# Patient Record
Sex: FEMALE | Race: WHITE | NOT HISPANIC OR LATINO | Employment: FULL TIME | URBAN - METROPOLITAN AREA
[De-identification: names, ages, dates, MRNs, and addresses within clinical notes are randomized per-mention and may not be internally consistent; named-entity substitution may affect disease eponyms.]

---
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
## 2020-07-22 PROBLEM — K59.9 FUNCTIONAL BOWEL DISORDER: Status: ACTIVE | Noted: 2020-07-22

## 2020-07-22 PROBLEM — K62.5 BRIGHT RED RECTAL BLEEDING: Status: ACTIVE | Noted: 2020-07-22

## 2021-01-07 DIAGNOSIS — R05.9 COUGH: ICD-10-CM

## 2021-01-07 PROCEDURE — U0005 INFEC AGEN DETEC AMPLI PROBE: HCPCS | Performed by: INTERNAL MEDICINE

## 2021-01-07 PROCEDURE — U0003 INFECTIOUS AGENT DETECTION BY NUCLEIC ACID (DNA OR RNA); SEVERE ACUTE RESPIRATORY SYNDROME CORONAVIRUS 2 (SARS-COV-2) (CORONAVIRUS DISEASE [COVID-19]), AMPLIFIED PROBE TECHNIQUE, MAKING USE OF HIGH THROUGHPUT TECHNOLOGIES AS DESCRIBED BY CMS-2020-01-R: HCPCS | Performed by: INTERNAL MEDICINE

## 2021-01-08 LAB — SARS-COV-2 RNA SPEC QL NAA+PROBE: NOT DETECTED

## 2021-04-13 DIAGNOSIS — Z23 ENCOUNTER FOR IMMUNIZATION: ICD-10-CM

## 2021-05-12 PROBLEM — R10.13 EPIGASTRIC PAIN: Status: ACTIVE | Noted: 2021-05-12

## 2021-08-19 ENCOUNTER — HOSPITAL ENCOUNTER (OUTPATIENT)
Dept: ULTRASOUND IMAGING | Facility: HOSPITAL | Age: 21
Discharge: HOME | End: 2021-08-19
Payer: COMMERCIAL

## 2021-08-19 DIAGNOSIS — R10.13 ABDOMINAL PAIN, EPIGASTRIC: ICD-10-CM

## 2021-08-19 PROCEDURE — 76705 ECHO EXAM OF ABDOMEN: CPT

## 2021-11-22 ENCOUNTER — CONSULT (OUTPATIENT)
Dept: GASTROENTEROLOGY | Facility: CLINIC | Age: 21
End: 2021-11-22
Payer: COMMERCIAL

## 2021-11-22 VITALS
WEIGHT: 205 LBS | HEART RATE: 88 BPM | BODY MASS INDEX: 34.16 KG/M2 | TEMPERATURE: 98.4 F | DIASTOLIC BLOOD PRESSURE: 75 MMHG | HEIGHT: 65 IN | SYSTOLIC BLOOD PRESSURE: 107 MMHG

## 2021-11-22 DIAGNOSIS — R10.13 EPIGASTRIC PAIN: Primary | ICD-10-CM

## 2021-11-22 DIAGNOSIS — K59.00 CONSTIPATION, UNSPECIFIED CONSTIPATION TYPE: ICD-10-CM

## 2021-11-22 PROCEDURE — 99243 OFF/OP CNSLTJ NEW/EST LOW 30: CPT | Performed by: PHYSICIAN ASSISTANT

## 2021-11-22 RX ORDER — CETIRIZINE HYDROCHLORIDE 10 MG/1
TABLET ORAL DAILY
COMMUNITY

## 2021-11-22 RX ORDER — FLUTICASONE PROPIONATE 50 MCG
SPRAY, SUSPENSION (ML) NASAL DAILY
COMMUNITY
End: 2022-01-18

## 2021-11-22 RX ORDER — MECLIZINE HYDROCHLORIDE 25 MG/1
TABLET ORAL
COMMUNITY

## 2021-11-22 RX ORDER — ONDANSETRON 4 MG/1
TABLET, ORALLY DISINTEGRATING ORAL
COMMUNITY
Start: 2021-08-18

## 2021-11-29 DIAGNOSIS — K59.00 CONSTIPATION, UNSPECIFIED CONSTIPATION TYPE: Primary | ICD-10-CM

## 2022-01-17 ENCOUNTER — TELEPHONE (OUTPATIENT)
Dept: GASTROENTEROLOGY | Facility: AMBULARY SURGERY CENTER | Age: 22
End: 2022-01-17

## 2022-01-18 ENCOUNTER — ANESTHESIA EVENT (OUTPATIENT)
Dept: GASTROENTEROLOGY | Facility: AMBULARY SURGERY CENTER | Age: 22
End: 2022-01-18

## 2022-01-18 ENCOUNTER — HOSPITAL ENCOUNTER (OUTPATIENT)
Dept: GASTROENTEROLOGY | Facility: AMBULARY SURGERY CENTER | Age: 22
Setting detail: OUTPATIENT SURGERY
Discharge: HOME | End: 2022-01-18
Admitting: PHYSICIAN ASSISTANT
Payer: COMMERCIAL

## 2022-01-18 ENCOUNTER — ANESTHESIA (OUTPATIENT)
Dept: GASTROENTEROLOGY | Facility: AMBULARY SURGERY CENTER | Age: 22
End: 2022-01-18

## 2022-01-18 VITALS
RESPIRATION RATE: 18 BRPM | WEIGHT: 216 LBS | DIASTOLIC BLOOD PRESSURE: 63 MMHG | HEART RATE: 85 BPM | BODY MASS INDEX: 35.99 KG/M2 | SYSTOLIC BLOOD PRESSURE: 113 MMHG | TEMPERATURE: 98.8 F | HEIGHT: 65 IN | OXYGEN SATURATION: 98 %

## 2022-01-18 DIAGNOSIS — R10.13 EPIGASTRIC PAIN: ICD-10-CM

## 2022-01-18 LAB
EXT PREGNANCY TEST URINE: NEGATIVE
EXT. CONTROL: NORMAL

## 2022-01-18 PROCEDURE — 88305 TISSUE EXAM BY PATHOLOGIST: CPT | Performed by: PATHOLOGY

## 2022-01-18 PROCEDURE — 43239 EGD BIOPSY SINGLE/MULTIPLE: CPT | Performed by: INTERNAL MEDICINE

## 2022-01-18 PROCEDURE — 81025 URINE PREGNANCY TEST: CPT | Performed by: INTERNAL MEDICINE

## 2022-01-18 RX ORDER — SODIUM CHLORIDE, SODIUM LACTATE, POTASSIUM CHLORIDE, CALCIUM CHLORIDE 600; 310; 30; 20 MG/100ML; MG/100ML; MG/100ML; MG/100ML
INJECTION, SOLUTION INTRAVENOUS CONTINUOUS PRN
Status: DISCONTINUED | OUTPATIENT
Start: 2022-01-18 | End: 2022-01-18

## 2022-01-18 RX ORDER — ALBUTEROL SULFATE 2.5 MG/3ML
2.5 SOLUTION RESPIRATORY (INHALATION) ONCE AS NEEDED
Status: DISCONTINUED | OUTPATIENT
Start: 2022-01-18 | End: 2022-01-22 | Stop reason: HOSPADM

## 2022-01-18 RX ORDER — MEPERIDINE HYDROCHLORIDE 25 MG/ML
12.5 INJECTION INTRAMUSCULAR; INTRAVENOUS; SUBCUTANEOUS
Status: DISCONTINUED | OUTPATIENT
Start: 2022-01-18 | End: 2022-01-22 | Stop reason: HOSPADM

## 2022-01-18 RX ORDER — HYDROMORPHONE HCL 110MG/55ML
0.5 PATIENT CONTROLLED ANALGESIA SYRINGE INTRAVENOUS
Status: DISCONTINUED | OUTPATIENT
Start: 2022-01-18 | End: 2022-01-22 | Stop reason: HOSPADM

## 2022-01-18 RX ORDER — FENTANYL CITRATE/PF 50 MCG/ML
25 SYRINGE (ML) INJECTION
Status: DISCONTINUED | OUTPATIENT
Start: 2022-01-18 | End: 2022-01-22 | Stop reason: HOSPADM

## 2022-01-18 RX ORDER — PROMETHAZINE HYDROCHLORIDE 25 MG/ML
12.5 INJECTION, SOLUTION INTRAMUSCULAR; INTRAVENOUS ONCE AS NEEDED
Status: DISCONTINUED | OUTPATIENT
Start: 2022-01-18 | End: 2022-01-22 | Stop reason: HOSPADM

## 2022-01-18 RX ORDER — ONDANSETRON 2 MG/ML
4 INJECTION INTRAMUSCULAR; INTRAVENOUS ONCE AS NEEDED
Status: DISCONTINUED | OUTPATIENT
Start: 2022-01-18 | End: 2022-01-22 | Stop reason: HOSPADM

## 2022-01-18 RX ADMIN — SODIUM CHLORIDE, SODIUM LACTATE, POTASSIUM CHLORIDE, AND CALCIUM CHLORIDE: .6; .31; .03; .02 INJECTION, SOLUTION INTRAVENOUS at 09:10

## 2022-01-18 NOTE — H&P
History and Physical -  [facility]  Blanche Ríos 24 y o  female MRN: [id number]      HPI: Blanche Ríos is a 24y o  year old female who presents for epigastric pain for EGD  REVIEW OF SYSTEMS: Per the HPI, and otherwise unremarkable  Historical Information   No past medical history on file  No past surgical history on file  Social History   Social History     Substance and Sexual Activity   Alcohol Use No     Social History     Substance and Sexual Activity   Drug Use No     Social History     Tobacco Use   Smoking Status Never Smoker   Smokeless Tobacco Never Used     Family History   Problem Relation Age of Onset    Colon cancer Neg Hx        Meds/Allergies       Current Outpatient Medications:     cetirizine (ZyrTEC Allergy) 10 mg tablet    fluticasone (Flonase) 50 mcg/act nasal spray    levonorgestrel-ethinyl estradiol (NORDETTE) 0 15-30 MG-MCG per tablet    linaCLOtide 290 MCG CAPS    meclizine (ANTIVERT) 25 mg tablet    ondansetron (ZOFRAN-ODT) 4 mg disintegrating tablet    predniSONE 20 mg tablet    No Known Allergies    Objective     There were no vitals taken for this visit  PHYSICAL EXAM    Gen: NAD  Head: NCAT  CV: RRR  CHEST: Clear  ABD: soft, NT/ND  EXT: no edema      ASSESSMENT/PLAN:  This is a 24y o  year old female here for EGD, and she is stable and optimized for her procedure

## 2022-01-18 NOTE — ANESTHESIA PREPROCEDURE EVALUATION
Procedure:  EGD    Relevant Problems   GI/HEPATIC   (+) Bright red rectal bleeding      Other   (+) Epigastric pain   (+) Functional bowel disorder        Physical Exam    Airway    Mallampati score: I  TM Distance: >3 FB  Neck ROM: full     Dental       Cardiovascular  Cardiovascular exam normal    Pulmonary  Pulmonary exam normal     Other Findings        Anesthesia Plan  ASA Score- 2     Anesthesia Type- IV sedation with anesthesia with ASA Monitors  Additional Monitors:   Airway Plan:           Plan Factors-Exercise tolerance (METS): >4 METS  Chart reviewed  EKG reviewed  Imaging results reviewed  Existing labs reviewed  Patient summary reviewed  Induction- intravenous  Postoperative Plan- Plan for postoperative opioid use  Planned trial extubation    Informed Consent- Anesthetic plan and risks discussed with patient  I personally reviewed this patient with the CRNA  Discussed and agreed on the Anesthesia Plan with the CRNA  Donavon Gonzalez

## 2022-01-18 NOTE — ANESTHESIA POSTPROCEDURE EVALUATION
Post-Op Assessment Note    CV Status:  Stable    Pain management: adequate  Multimodal analgesia used between 6 hours prior to anesthesia start to PACU discharge    Mental Status:  Sleepy   PONV Controlled:  Controlled   Airway Patency:  Patent   Two or more mitigation strategies used for obstructive sleep apnea   Post Op Vitals Reviewed: Yes      Staff: Anesthesiologist         No complications documented      /59 (01/18/22 0951)    Temp 98 8 °F (37 1 °C) (01/18/22 0951)    Pulse 93 (01/18/22 0951)   Resp 15 (01/18/22 0951)    SpO2 94 % (01/18/22 0951)

## 2022-02-08 ENCOUNTER — TELEPHONE (OUTPATIENT)
Dept: GASTROENTEROLOGY | Facility: CLINIC | Age: 22
End: 2022-02-08

## 2022-02-10 ENCOUNTER — OFFICE VISIT (OUTPATIENT)
Dept: BARIATRICS | Facility: CLINIC | Age: 22
End: 2022-02-10
Payer: COMMERCIAL

## 2022-02-10 VITALS
TEMPERATURE: 98.9 F | HEIGHT: 65 IN | WEIGHT: 213.5 LBS | HEART RATE: 71 BPM | SYSTOLIC BLOOD PRESSURE: 112 MMHG | BODY MASS INDEX: 35.57 KG/M2 | RESPIRATION RATE: 14 BRPM | DIASTOLIC BLOOD PRESSURE: 76 MMHG

## 2022-02-10 DIAGNOSIS — E66.9 OBESITY, CLASS II, BMI 35-39.9: Primary | ICD-10-CM

## 2022-02-10 PROCEDURE — 99203 OFFICE O/P NEW LOW 30 MIN: CPT | Performed by: PHYSICIAN ASSISTANT

## 2022-02-10 NOTE — PROGRESS NOTES
Assessment/Plan:    Obesity, Class II, BMI 35-39 9  -Discussed options of Healthy[facility]-Intensive Lifestyle Intervention Program, Very Low Calorie Diet-VLCD and Conservative Program and the role of weight loss medications   -Initial weight loss goal of 5-10% weight loss for improved health  -Screening labs  Recommend checking lab coverage before having labs drawn  -NEEDS Lipids, tsh, a1c, cmp, fasting insulin   - STOP BANG-1/8  -patient just bought another keto plan  Wants to try that  If not successful will call back for vlcd           Diagnoses and all orders for this visit:    Obesity, Class II, BMI 35-39 9  -     TSH, 3rd generation with Free T4 reflex; Future  -     Comprehensive metabolic panel; Future  -     HEMOGLOBIN A1C W/ EAG ESTIMATION; Future  -     Insulin, fasting; Future  -     Lipid panel; Future          Subjective:   Chief Complaint   Patient presents with    Consult     MWM Consult       Patient ID: Allie Mike  is a 24 y o  female with excess weight/obesity here to pursue weight management  History reviewed  No pertinent past medical history  HPI: pwc consultant   Obesity/Excess Weight:  Severity: Severe  Onset:  Since august 2021   Modifiers: Diet and Exercise  Contributing factors: portion size   Associated symptoms: decreased self esteem     Going to  2-3 times per week     Goals: 180 lbs   Hydration: 64 oz of water, 1 cup of coffee with SF creamer   Alcohol: 4-8 drinks per week        The following portions of the patient's history were reviewed and updated as appropriate: allergies, current medications, past family history, past medical history, past social history, past surgical history and problem list     Review of Systems   HENT: Negative for sore throat  Respiratory: Negative for cough and shortness of breath  Cardiovascular: Negative for chest pain and palpitations  Gastrointestinal: Positive for abdominal pain (sees GI ), constipation and diarrhea   Negative for nausea and vomiting  Denies GERD   Endocrine: Negative for cold intolerance and heat intolerance  Genitourinary: Negative for dysuria  Musculoskeletal: Negative for arthralgias and back pain  Skin: Negative for rash  Neurological: Negative for headaches  Psychiatric/Behavioral: Negative for suicidal ideas (denies HI)  Denies Depression and anxiety       Objective:    /76   Pulse 71   Temp 98 9 °F (37 2 °C) (Tympanic)   Resp 14   Ht 5' 5" (1 651 m)   Wt 96 8 kg (213 lb 8 oz)   BMI 35 53 kg/m²     Physical Exam  Vitals and nursing note reviewed  Constitutional   General appearance: Abnormal   well developed and morbidly obese  Eyes No conjunctival injection   Pulmonary   Respiratory effort: No increased work of breathing or signs of respiratory distress  Abdomen   Abdomen: Abnormal   The abdomen was obese  Musculoskeletal   Gait and station: Normal     Skin  Dry   No visible rashes   Psychiatric   Orientation to person, place and time: Normal     Affect: appropriate  Neurological   Normal gait

## 2022-02-10 NOTE — ASSESSMENT & PLAN NOTE
-Discussed options of Healthy[facility]-Intensive Lifestyle Intervention Program, Very Low Calorie Diet-VLCD and Conservative Program and the role of weight loss medications   -Initial weight loss goal of 5-10% weight loss for improved health  -Screening labs  Recommend checking lab coverage before having labs drawn  -NEEDS Lipids, tsh, a1c, cmp, fasting insulin   - STOP BANG-1/8  -patient just bought another keto plan  Wants to try that   If not successful will call back for vlcd

## 2022-03-14 ENCOUNTER — OFFICE VISIT (OUTPATIENT)
Dept: GASTROENTEROLOGY | Facility: CLINIC | Age: 22
End: 2022-03-14
Payer: COMMERCIAL

## 2022-03-14 VITALS
SYSTOLIC BLOOD PRESSURE: 106 MMHG | HEART RATE: 81 BPM | BODY MASS INDEX: 35.59 KG/M2 | WEIGHT: 213.6 LBS | DIASTOLIC BLOOD PRESSURE: 76 MMHG | TEMPERATURE: 99.1 F | HEIGHT: 65 IN | OXYGEN SATURATION: 96 %

## 2022-03-14 DIAGNOSIS — K59.00 CONSTIPATION, UNSPECIFIED CONSTIPATION TYPE: Primary | ICD-10-CM

## 2022-03-14 PROCEDURE — 99213 OFFICE O/P EST LOW 20 MIN: CPT | Performed by: PHYSICIAN ASSISTANT

## 2022-03-14 RX ORDER — LUBIPROSTONE 8 UG/1
8 CAPSULE, GELATIN COATED ORAL 2 TIMES DAILY WITH MEALS
Qty: 60 CAPSULE | Refills: 1 | Status: SHIPPED | OUTPATIENT
Start: 2022-03-14 | End: 2022-05-09

## 2022-03-14 NOTE — PROGRESS NOTES
[facility] Outpatient Follow-up [facility]  Ray Diza 24 y o  female MRN: [id number]  Encounter: [id number]          ASSESSMENT AND PLAN:      1  Constipation, unspecified constipation type  2  Irritable Bowel Syndrome    - lubiprostone (AMITIZA) 8 mcg capsule; Take 1 capsule (8 mcg total) by mouth 2 (two) times a day with meals  Dispense: 60 capsule; Refill: 1      Patient continues to experience abdominal pain/cramping and bowel movements that are predominantly constipation but will alternate to diarrhea  Colonoscopy normal in 2020 and recent EGD normal with negative biopsies for celiac and H pylori  She has atypical responses to bowel regimen agents - stool softeners cause diarrhea, while Miralax and Linzess did not work to stimulate a bowel movement  Will trial on amitiza 8mcg 1-2x daily  Advised her to keep a journal of food and symptoms to see if we can identify any sensitivities or trigger foods  She is not interested in nutritional counseling for the low-FODMAP diet at this time, since she feels she is eating a clean diet  She will continue to follow with her PCP who completed a workup for her abdominal pain and recent weight gain  If her bowel pattern becomes more diarrhea-predominant, can consider a trial of Xifaxan  Discussed with the patient and her mother and all questions were answered  Both are in agreement with this plan   ______________________________________________________________________    SUBJECTIVE:  Cinda is a 80-year-old female who presents for follow up of abdominal pain and bowel disturbance  She had an EGD on 01/18/2022 which was a normal study, bx negative for celiac and H pylori  She has stopped taking TUMs since this didn't seem effective for her    She continues to experience abdominal pain which can be epigastric or periumbilical   Her bowel movements alternate between days of constipation with a sense of fullness and cramping, and days of loose watery stools  She tried Linzess which caused increased abdominal pain and did not result in a bowel movement  She has tried multiple other agents  She states stool softeners resulted in diarrhea  Miralax 1-2x daily did not result in a bowel movement  She has not had any changes in her menstrual cycle  She has followed with her PCP for weight gain with reportedly normal TSH ([facility] could not download results for my review) and has also seen bariatric surgery for consultation  REVIEW OF SYSTEMS IS OTHERWISE NEGATIVE  Historical Information   History reviewed  No pertinent past medical history  Past Surgical History:   Procedure Laterality Date    COLONOSCOPY      WISDOM TOOTH EXTRACTION       Social History   Social History     Substance and Sexual Activity   Alcohol Use No     Social History     Substance and Sexual Activity   Drug Use No     Social History     Tobacco Use   Smoking Status Never Smoker   Smokeless Tobacco Never Used     Family History   Problem Relation Age of Onset    Cancer Mother         lymphoma     Cancer Father         prostate     Breast cancer Paternal Aunt     Brain cancer Maternal Grandmother     Cancer Paternal Grandfather     Colon cancer Neg Hx     Diabetes Neg Hx     Hypertension Neg Hx     Heart disease Neg Hx     Stroke Neg Hx     Thyroid disease Neg Hx        Meds/Allergies       Current Outpatient Medications:     cetirizine (ZyrTEC Allergy) 10 mg tablet    levonorgestrel-ethinyl estradiol (NORDETTE) 0 15-30 MG-MCG per tablet    meclizine (ANTIVERT) 25 mg tablet    ondansetron (ZOFRAN-ODT) 4 mg disintegrating tablet    lubiprostone (AMITIZA) 8 mcg capsule    No Known Allergies        Objective     Blood pressure 106/76, pulse 81, temperature 99 1 °F (37 3 °C), temperature source Tympanic, height 5' 5" (1 651 m), weight 96 9 kg (213 lb 9 6 oz), SpO2 96 %  Body mass index is 35 54 kg/m²        PHYSICAL EXAM:      General Appearance:   Alert, cooperative, no distress   HEENT:   Normocephalic, atraumatic, anicteric      Neck:  Supple, symmetrical, trachea midline   Lungs:   Clear to auscultation bilaterally; no rales, rhonchi or wheezing; respirations unlabored    Heart[de-identified]   Regular rate and rhythm; no murmur, rub, or gallop     Abdomen:   Soft, mildly-tender diffusely, non-distended; hypoactive bowel sounds; no masses, no organomegaly    Genitalia:   Deferred    Rectal:   Deferred    Extremities:  No cyanosis, clubbing or edema    Pulses:  2+ and symmetric    Skin:  No jaundice, rashes, or lesions        [place] Cassette, PA-C

## 2022-03-24 ENCOUNTER — TELEPHONE (OUTPATIENT)
Dept: GASTROENTEROLOGY | Facility: CLINIC | Age: 22
End: 2022-03-24

## 2022-03-24 NOTE — TELEPHONE ENCOUNTER
----- Message from Laura Encarnacion sent at 3/23/2022  2:30 PM EDT -----  Regarding: Prescription   Hello  This has actually not been resolved  I just got off the phone with [facility] and they said that they sent you a prescription notice  If they do not get an answer from you about some approval then I will not be able to receive the prescription  Can you please help me out with this as [place] been waiting over a week now to receive this prescription and it turns out the issue is on your end  Thanks for your time!     Cinda

## 2022-03-24 NOTE — TELEPHONE ENCOUNTER
I called [facility]  3x holding and no one picks up  I called back 4th time, hit option to leave a message , we need prescription coverage info, also to confirm prior Brinson Bethany is needed since Achilles Shear is now generic , maybe cost is due to pts deductible  Awaiting call back

## 2022-03-25 NOTE — TELEPHONE ENCOUNTER
Submitted auth request through cover my meds  [facility]  Sent to Oscar Kelly  [phone]   Will follow up later today

## 2022-03-25 NOTE — TELEPHONE ENCOUNTER
Called and spoke with pharmacist  [place] and generic need prior auth  Received script info  [id number], PCN-[facility], GRP-RXCAP ID# [id number]   Will start auth now

## 2022-03-28 NOTE — TELEPHONE ENCOUNTER
Not sure if you know if they have tried Lactulose? Possible addend note? Or try another med    Graciella More

## 2022-03-28 NOTE — TELEPHONE ENCOUNTER
Called [employer] at [phone] and spoke with [facility]  She stated they denied the appeal for the Amitiza bc they have no documentation that patient tried and failed Lactulose and no note as to why pt should be on Lubiprostone  Please advise     Thank you

## 2022-03-28 NOTE — TELEPHONE ENCOUNTER
[place] denied as she has not tried & failed lactulose  This was sent to her pharmacy  If no improvement then can likely get jaylin Ellington

## 2022-03-29 ENCOUNTER — TELEPHONE (OUTPATIENT)
Dept: GASTROENTEROLOGY | Facility: AMBULARY SURGERY CENTER | Age: 22
End: 2022-03-29

## 2022-03-29 NOTE — TELEPHONE ENCOUNTER
Pt returned my call  I went over why insurance will not cover the [address]  Pt verbalized understanding  I advised if Lactulose is not helping to call the office asap and we can fight for the Amitiza again  Pt understood

## 2022-03-29 NOTE — TELEPHONE ENCOUNTER
Called pt and left detailed message explaining per her insurance they want her to try Lactulose first and that it was already called into the pharmacy  I advised pt to please return my call to verify she did receive my message and to verify she had no further questions

## 2022-03-29 NOTE — TELEPHONE ENCOUNTER
Pt was sent lactulose a few days ago b/c insurance would not cover Amitiza until this was tried & failed  We would need documentation that she tried & failed lactulose    Juanita Min

## 2022-03-29 NOTE — TELEPHONE ENCOUNTER
PT called and stated the liquid Trulance is very inconvenient , she travels every two weeks and it is difficult to take on a plane  She would like the pills called in instead  Please call pt when pills sent in  Thank you!

## 2022-03-30 NOTE — TELEPHONE ENCOUNTER
Spoke with patient who states that lactulose 30 mL HS x 1 caused diarrhea  Recommend pt trial 15 mL's qd  She had no further questions or concerns

## 2022-03-30 NOTE — TELEPHONE ENCOUNTER
Patients GI provider:  PA: Judi Lazcano     Number to return call: [phone]     Reason for call: Pt calling returning a call from someone   Patient stated she receive her new medication and would like to speak with a nurse    Scheduled procedure/appointment date if applicable: Apt/procedure 5/9/22

## 2022-05-09 ENCOUNTER — OFFICE VISIT (OUTPATIENT)
Dept: GASTROENTEROLOGY | Facility: CLINIC | Age: 22
End: 2022-05-09
Payer: COMMERCIAL

## 2022-05-09 VITALS
BODY MASS INDEX: 35.29 KG/M2 | DIASTOLIC BLOOD PRESSURE: 79 MMHG | HEART RATE: 74 BPM | HEIGHT: 65 IN | SYSTOLIC BLOOD PRESSURE: 114 MMHG | WEIGHT: 211.8 LBS | TEMPERATURE: 98.4 F

## 2022-05-09 DIAGNOSIS — K58.1 IRRITABLE BOWEL SYNDROME WITH CONSTIPATION: ICD-10-CM

## 2022-05-09 DIAGNOSIS — K58.1 IRRITABLE BOWEL SYNDROME WITH CONSTIPATION: Primary | ICD-10-CM

## 2022-05-09 PROCEDURE — 99212 OFFICE O/P EST SF 10 MIN: CPT | Performed by: PHYSICIAN ASSISTANT

## 2022-05-09 RX ORDER — LUBIPROSTONE 8 UG/1
8 CAPSULE, GELATIN COATED ORAL 2 TIMES DAILY WITH MEALS
Qty: 60 CAPSULE | Refills: 3 | Status: SHIPPED | OUTPATIENT
Start: 2022-05-09 | End: 2022-05-17

## 2022-05-09 NOTE — PROGRESS NOTES
[facility] - Outpatient Follow-up Note  Hanane Baeza 24 y o  female MRN: [id number]  Encounter: [id number]          ASSESSMENT AND PLAN:      1  Irritable Bowel Syndrome with Constipation    Patient presents with symptoms of severe constipation characterized by the absence of spontaneous bowel movements for more than 7 days at a time and accompanying abdominal pain, bloating, and gas  She had a normal colonoscopy in 2020 and normal EGD in 2022  She has tried numerous prescription and over-the-counter laxatives including fiber supplement and probiotics, Miralax, stool softeners, Linzess, and Lactulose  She has tried and failed lifestyle modifications including dietary modifications and adequate hydration  She has finished school and is working part-time and traveling with minimal stress  She would most benefit from a trial of Amitiza - will start at lower dose 8mcg BID but can increase to 24mcg BID if response is insufficient  Discussed this in detail with Cinda and her mother and both are in agreement with this plan   ______________________________________________________________________    SUBJECTIVE:  Cinda is a 77-year-old female who presents for follow up  She has long struggled with constipation, abdominal pain and bloating  At her last visit, she was recommended to try Amitiza after failing the agents noted above  Her insurance would not cover the Amitiza until she had failed lactulose  She states she tried lactulose on several occasions  She tried different doses but each time experienced explosive diarrhea for hours accompanied by abdominal pain and cramping  She has since stopped the lactulose and she can go 7-10 days without a spontaneous bowel movement  She reports being very uncomfortable with this as it also interferes with her part-time work and travel    She is planned to start a new job in August in [place] and is hoping to get some improvement in her IBS symptoms before she starts her new job  She had a colonoscopy in 2020 without significant abnormalities  She presently denies melena, hematochezia, or weight loss  REVIEW OF SYSTEMS IS OTHERWISE NEGATIVE  Historical Information   History reviewed  No pertinent past medical history  Past Surgical History:   Procedure Laterality Date    COLONOSCOPY      WISDOM TOOTH EXTRACTION       Social History   Social History     Substance and Sexual Activity   Alcohol Use No     Social History     Substance and Sexual Activity   Drug Use No     Social History     Tobacco Use   Smoking Status Never Smoker   Smokeless Tobacco Never Used     Family History   Problem Relation Age of Onset    Cancer Mother         lymphoma     Cancer Father         prostate     Breast cancer Paternal Aunt     Brain cancer Maternal Grandmother     Cancer Paternal Grandfather     Colon cancer Neg Hx     Diabetes Neg Hx     Hypertension Neg Hx     Heart disease Neg Hx     Stroke Neg Hx     Thyroid disease Neg Hx        Meds/Allergies       Current Outpatient Medications:     cetirizine (ZyrTEC Allergy) 10 mg tablet    lactulose 20 g/30 mL    levonorgestrel-ethinyl estradiol (NORDETTE) 0 15-30 MG-MCG per tablet    lubiprostone (AMITIZA) 8 mcg capsule    meclizine (ANTIVERT) 25 mg tablet    ondansetron (ZOFRAN-ODT) 4 mg disintegrating tablet    No Known Allergies        Objective     Blood pressure 114/79, pulse 74, temperature 98 4 °F (36 9 °C), temperature source Tympanic, height 5' 5" (1 651 m), weight 96 1 kg (211 lb 12 8 oz)  Body mass index is 35 25 kg/m²  PHYSICAL EXAM:      General Appearance:   Alert, cooperative, no distress   HEENT:   Normocephalic, atraumatic, anicteric      Neck:  Supple, symmetrical, trachea midline   Lungs:   Respirations unlabored    Heart[de-identified]   Regular rate and rhythm     Abdomen:   Non-distended    Genitalia:   Deferred    Rectal:   Deferred    Extremities:  No cyanosis, clubbing or edema    Pulses:  2+ and symmetric    Skin:  No jaundice, rashes, or lesions        Filomena Jacobsen PA-C    Answers for HPI/ROS submitted by the patient on 5/2/2022  Chronicity: chronic  Onset: more than 1 year ago  Onset quality: undetermined  Frequency: constantly  Episode duration: 2 hours  Progression since onset: waxing and waning  Pain location: generalized abdominal region  Pain - numeric: 7/10  Pain quality: aching, cramping  Radiates to: does not radiate  anorexia: No  arthralgias: No  belching: No  constipation: Yes  diarrhea: Yes  dysuria: No  fever: No  flatus: No  frequency: No  headaches: No  hematochezia: Yes  hematuria: No  melena: No  myalgias: No  nausea:  No  weight loss: No  vomiting: No  Aggravated by: nothing  Relieved by: nothing  Diagnostic workup: lower endoscopy, ultrasound

## 2022-05-17 ENCOUNTER — TELEPHONE (OUTPATIENT)
Dept: GASTROENTEROLOGY | Facility: CLINIC | Age: 22
End: 2022-05-17

## 2022-05-17 RX ORDER — LUBIPROSTONE 8 UG/1
CAPSULE, GELATIN COATED ORAL
Qty: 60 CAPSULE | Refills: 3 | Status: SHIPPED | OUTPATIENT
Start: 2022-05-17

## 2022-05-17 NOTE — TELEPHONE ENCOUNTER
Cover my meds  keycode- [id number]    Amitzia 8 mcg capsules       Your information has been submitted to [place]  [employer] is reviewing the PA request and you will receive an electronic response  You may check for the updated outcome later by reopening this request  The standard fax determination will also be sent to you directly  If you have any questions about your PA submission, contact [facility] [phone]

## 2022-05-20 NOTE — TELEPHONE ENCOUNTER
I called [facility] pharmacy , script ready for  , copay is 70 00, left pt vm of updated info